# Patient Record
Sex: FEMALE | Race: BLACK OR AFRICAN AMERICAN | NOT HISPANIC OR LATINO | Employment: OTHER | URBAN - METROPOLITAN AREA
[De-identification: names, ages, dates, MRNs, and addresses within clinical notes are randomized per-mention and may not be internally consistent; named-entity substitution may affect disease eponyms.]

---

## 2017-11-02 ENCOUNTER — OFFICE VISIT (OUTPATIENT)
Dept: CARDIOLOGY | Facility: CLINIC | Age: 77
End: 2017-11-02

## 2017-11-02 VITALS
SYSTOLIC BLOOD PRESSURE: 124 MMHG | BODY MASS INDEX: 26.96 KG/M2 | WEIGHT: 182 LBS | DIASTOLIC BLOOD PRESSURE: 76 MMHG | HEART RATE: 91 BPM | HEIGHT: 69 IN

## 2017-11-02 DIAGNOSIS — I42.0 CARDIOMYOPATHY, DILATED, NONISCHEMIC (HCC): Primary | ICD-10-CM

## 2017-11-02 DIAGNOSIS — I10 ESSENTIAL HYPERTENSION: ICD-10-CM

## 2017-11-02 DIAGNOSIS — E78.2 MIXED HYPERLIPIDEMIA: ICD-10-CM

## 2017-11-02 DIAGNOSIS — I44.7 LBBB (LEFT BUNDLE BRANCH BLOCK): ICD-10-CM

## 2017-11-02 DIAGNOSIS — G47.33 OBSTRUCTIVE SLEEP APNEA SYNDROME: ICD-10-CM

## 2017-11-02 PROCEDURE — 99214 OFFICE O/P EST MOD 30 MIN: CPT | Performed by: INTERNAL MEDICINE

## 2017-11-02 PROCEDURE — 93000 ELECTROCARDIOGRAM COMPLETE: CPT | Performed by: INTERNAL MEDICINE

## 2017-11-02 RX ORDER — CARVEDILOL 6.25 MG/1
6.25 TABLET ORAL 2 TIMES DAILY WITH MEALS
Qty: 30 TABLET | Refills: 12 | Status: SHIPPED | OUTPATIENT
Start: 2017-11-02 | End: 2018-12-20 | Stop reason: SDUPTHER

## 2017-11-02 RX ORDER — LEVOTHYROXINE SODIUM 112 UG/1
TABLET ORAL
Refills: 1 | COMMUNITY
Start: 2017-08-30 | End: 2018-01-22 | Stop reason: SDUPTHER

## 2017-11-02 NOTE — PROGRESS NOTES
Date of Office Visit: 17  Encounter Provider: Paddy Haynes MD  Place of Service: [facility] CARDIOLOGY  Patient Name: Eve Mckeon  :1940      Chief Complaint   Patient presents with   • Cardiomyopathy     annual follow up     History of Present Illness  HPI Comments: The patient is a pleasant 77-year-old female who presented in 2015 with some dyspnea  and a left bundle branch block. As part of that evaluation, she had an echocardiogram  that showed mild left ventricular systolic dysfunction, left ventricular ejection fraction  45%, abnormal strain imaging, and moderate concentric hypertrophy. No significant  valvular disease. She had a perfusion stress test that showed no ischemia but evidence of  LV dysfunction so she had cardiac catheterization performed on 2015 which showed  left ventricular ejection fraction of 40% and no significant coronary disease. She comes  in for followup.  She says she has no real limit her activity is low she goes her pace she's gained 4 pounds and she was last year but she's not really been weighing herself every day.  She may be a little more out of breath.  Does get some palpitations about every other day lasting 5-15 minutes but no associated dizziness lightheadedness, near-syncope or syncope denies chest pain and pressure.  Denies orthopnea PND.  She does snore and again is been very fatigued and tired at times very sleepy during the day.    Cardiomyopathy   Pertinent negatives include no abdominal pain, congestion, diaphoresis, fever, headaches, joint swelling, myalgias, nausea, numbness, rash, vertigo, vomiting or weakness.         Past Medical History:   Diagnosis Date   • Anemia    • Cardiomyopathy    • Dyspnea    • Dyspnea    • GERD (gastroesophageal reflux disease)    • Hypertension    • Hypothyroidism    • LBBB (left bundle branch block)          Past Surgical History:   Procedure Laterality Date   • BACK  SURGERY     • BLADDER SURGERY     • CARDIAC CATHETERIZATION      09/24/2015; nonobstructive coronary disease.   • HYSTERECTOMY     • TONSILLECTOMY           Current Outpatient Prescriptions on File Prior to Visit   Medication Sig Dispense Refill   • albuterol (VENTOLIN HFA) 108 (90 BASE) MCG/ACT inhaler Ventolin  (90 Base) MCG/ACT Inhalation Aerosol Solution; Patient Sig: Ventolin  (90 Base) MCG/ACT Inhalation Aerosol Solution ; 0; 15-Jun-2015; Active     • docusate sodium (COLACE) 250 MG capsule Take  by mouth Daily.     • etodolac (LODINE) 400 MG tablet Take  by mouth 2 (Two) Times a Day.     • Ferrous Sulfate (IRON) 325 (65 FE) MG tablet Take  by mouth Daily.     • furosemide (LASIX) 20 MG tablet Take 20 mg by mouth Daily As Needed.     • HYDROcodone-acetaminophen (NORCO) 5-325 MG per tablet Take  by mouth.     • levocetirizine (XYZAL) 5 MG tablet Take 10 mg by mouth Daily.     • levothyroxine (SYNTHROID, LEVOTHROID) 125 MCG tablet Take 112 mcg by mouth Daily.     • Linaclotide (LINZESS) 145 MCG capsule Take 1 capsule by mouth Daily.     • lisinopril (PRINIVIL,ZESTRIL) 2.5 MG tablet TAKE ONE TABLET BY MOUTH DAILY 90 tablet 0   • loratadine (CLARITIN) 10 MG tablet Take  by mouth Daily.     • Melatonin 3 MG capsule Take  by mouth.     • pantoprazole (PROTONIX) 20 MG EC tablet Take  by mouth Daily.     • potassium chloride (MICRO-K) 8 MEQ CR capsule Take  by mouth.     • simvastatin (ZOCOR) 40 MG tablet Take  by mouth Daily.     • [DISCONTINUED] carvedilol (COREG) 3.125 MG tablet Take 3.125 mg by mouth 2 (Two) Times a Day With Meals.       No current facility-administered medications on file prior to visit.          Social History     Social History   • Marital status: Single     Spouse name: N/A   • Number of children: N/A   • Years of education: N/A     Occupational History   • Not on file.     Social History Main Topics   • Smoking status: Former Smoker   • Smokeless tobacco: Not on file   • Alcohol  "use No   • Drug use: Not on file   • Sexual activity: Not on file     Other Topics Concern   • Not on file     Social History Narrative       Family History   Problem Relation Age of Onset   • Heart attack Mother    • Heart failure Mother    • Diabetes Father          Review of Systems   Constitution: Positive for malaise/fatigue. Negative for decreased appetite, diaphoresis, fever, weakness, weight gain and weight loss.   HENT: Negative for congestion, hearing loss, nosebleeds and tinnitus.    Eyes: Negative for blurred vision, double vision, vision loss in left eye, vision loss in right eye and visual disturbance.   Cardiovascular: Negative for orthopnea.        As noted in HPI   Respiratory: Negative for shortness of breath.         As noted HPI   Endocrine: Negative for cold intolerance and heat intolerance.   Hematologic/Lymphatic: Negative for bleeding problem. Does not bruise/bleed easily.   Skin: Negative for color change, flushing, itching and rash.   Musculoskeletal: Negative for arthritis, back pain, joint pain, joint swelling, muscle weakness and myalgias.   Gastrointestinal: Negative for bloating, abdominal pain, constipation, diarrhea, dysphagia, heartburn, hematemesis, hematochezia, melena, nausea and vomiting.   Genitourinary: Negative for bladder incontinence, dysuria, frequency, nocturia and urgency.   Neurological: Negative for dizziness, focal weakness, headaches, light-headedness, loss of balance, numbness, paresthesias and vertigo.   Psychiatric/Behavioral: Negative for depression, memory loss and substance abuse.       Procedures      ECG 12 Lead  Date/Time: 11/2/2017 11:20 AM  Performed by: EVAN FLORES  Authorized by: EVAN FLORES   Comparison: compared with previous ECG   Similar to previous ECG  Rhythm: sinus rhythm  Rate: normal  Conduction: left bundle branch block  QRS axis: normal                 Objective:    /76 (BP Location: Left arm)  Pulse 91  Ht 69\" (175.3 cm)  " Wt 182 lb (82.6 kg)  BMI 26.88 kg/m2       Physical Exam  Physical Exam   Constitutional: She is oriented to person, place, and time. She appears well-developed and well-nourished. No distress.   HENT:   Head: Normocephalic.   Eyes: Conjunctivae are normal. Pupils are equal, round, and reactive to light. No scleral icterus.   Neck: Normal carotid pulses, no hepatojugular reflux and no JVD present. Carotid bruit is not present. No tracheal deviation, no edema and no erythema present. No thyromegaly present.   Cardiovascular: Normal rate, regular rhythm, S1 normal, S2 normal, normal heart sounds and intact distal pulses.   No extrasystoles are present. PMI is displaced.  Exam reveals no distant heart sounds and no friction rub.    No murmur heard.  Pulses:       Carotid pulses are 2+ on the right side, and 2+ on the left side.       Radial pulses are 2+ on the right side, and 2+ on the left side.        Femoral pulses are 2+ on the right side, and 2+ on the left side.       Dorsalis pedis pulses are 2+ on the right side, and 2+ on the left side.        Posterior tibial pulses are 2+ on the right side, and 2+ on the left side.   Pulmonary/Chest: Effort normal and breath sounds normal. No respiratory distress. She has no decreased breath sounds. She has no wheezes. She has no rhonchi. She has no rales. She exhibits no tenderness.   Abdominal: Soft. Bowel sounds are normal. She exhibits no distension and no mass. There is no hepatosplenomegaly. There is no tenderness. There is no rebound and no guarding.   Musculoskeletal: She exhibits no edema, tenderness or deformity.   Neurological: She is alert and oriented to person, place, and time.   Skin: Skin is warm and dry. No rash noted. She is not diaphoretic. No cyanosis or erythema. No pallor. Nails show no clubbing.   Psychiatric: She has a normal mood and affect. Her speech is normal and behavior is normal. Judgment and thought content normal.           Assessment:   1.  This is a 77-year-old female with chronic left bundle branch block, unchanged.  2. Nonischemic cardiomyopathy that may well be hypertension induced.   Heart Failure  Assessment  • NYHA class I - There is no limitation of physical activity. Physical activity does not cause fatigue, palpitations or shortness of breath.  • ACE inhibitor prescribed  • Beta blocker prescribed  • Diuretics prescribed  • Left ventricular function is mildly reduced by qualitative assessment    Plan  • The patient has received heart failure education on the following topics: dietary sodium restriction, medication instructions, minimizing or avoiding NSAID use, symptom management, physical activity, weight monitoring and minimizing alcohol intake  • The heart failure care plan was discussed with the patient today including: continuing the current program  •  The patient was not counseled about ICD or CRT-D implantation due to medical reasons    Subjective/Objective    • Physical exam findings negative for rales and elevated JVP.    I am concerned about her fatigue level.  She could have sleep apnea will take Him to titrate up her carvedilol.  In addition we've asked her to her weight herself daily Elvi 2-3 pounds in a day to give us a call.  We'll see her sooner in 4 months and she'll call for problems.     3. Hypertension as outlined above.  4. Hyperlipidemia on simvastatin   5.  Probable obstructive sleep apnea we are getting her set up for a sleep study.         Plan:

## 2018-01-18 ENCOUNTER — OFFICE VISIT (OUTPATIENT)
Dept: SLEEP MEDICINE | Facility: HOSPITAL | Age: 78
End: 2018-01-18
Attending: INTERNAL MEDICINE

## 2018-01-18 VITALS
HEIGHT: 69 IN | TEMPERATURE: 97.5 F | WEIGHT: 182 LBS | OXYGEN SATURATION: 97 % | HEART RATE: 92 BPM | SYSTOLIC BLOOD PRESSURE: 144 MMHG | DIASTOLIC BLOOD PRESSURE: 82 MMHG | BODY MASS INDEX: 26.96 KG/M2

## 2018-01-18 DIAGNOSIS — G47.33 OBSTRUCTIVE SLEEP APNEA SYNDROME: ICD-10-CM

## 2018-01-18 DIAGNOSIS — G47.10 HYPERSOMNIA WITH SLEEP APNEA: Primary | ICD-10-CM

## 2018-01-18 DIAGNOSIS — G47.30 HYPERSOMNIA WITH SLEEP APNEA: Primary | ICD-10-CM

## 2018-01-18 PROCEDURE — 99204 OFFICE O/P NEW MOD 45 MIN: CPT | Performed by: INTERNAL MEDICINE

## 2018-01-18 PROCEDURE — G0463 HOSPITAL OUTPT CLINIC VISIT: HCPCS

## 2018-01-18 NOTE — PROGRESS NOTES
[facility] New Patient[facility]ultation       Reason for Consultation: LUL    Patient Care Team:  Carlie Martin MD as PCP - Family Medicine  Paddy Haynes MD as Consulting Physician (Cardiology)  Len Hines MD as Consulting Physician (Sleep Medicine)    Chief complaint:  LUL    History of present illness:  Thank you for asking me to see your patient.  The patient is a 77 y.o. female who has nonischemic cardiomyopathy, possibly hypertension induced.  Cardiology asked me to evaluate the patient for LUL.  She has complaints of hypersomnolence and her [place] Sleepiness Scale is abnormal at 16.  She goes to bed between midnight and 1 AM and awakens at 8 AM.  She feels okay upon arising.  She will take a nap every day.  She does not know if she snores because she lives alone.  She has awaken about every 6 months gasping for breath.  25% of the time, she awakens with a headache.  She has leg jerking at night.  She does not use the bathroom at night.    Review of Systems:  Recorded on the Sleep Questionnaire.  Unremarkable except for frequent urination, nasal congestion, arthritis complaints, occasional cough, and some problems swallowing.    History:  Past Medical History:   Diagnosis Date   • Anemia    • Cardiomyopathy    • Dyspnea    • Dyspnea    • GERD (gastroesophageal reflux disease)    • Hypertension    • Hypothyroidism    • LBBB (left bundle branch block)    ,   Past Surgical History:   Procedure Laterality Date   • BACK SURGERY     • BLADDER SURGERY     • CARDIAC CATHETERIZATION      09/24/2015; nonobstructive coronary disease.   • HYSTERECTOMY     • TONSILLECTOMY     ,   Family History   Problem Relation Age of Onset   • Heart attack Mother    • Heart failure Mother    • Diabetes Father     and   Social History   Substance Use Topics   • Smoking status: Former Smoker     Start date: 1962     Quit date: 1982   • Smokeless tobacco: Never Used   • Alcohol use No       Social History:  She will  "have 2-4 sodas a day.    Allergies:  Sulfa antibiotics     Medication Review: I reviewed her medication list.    Vital Signs:    Vitals:    01/18/18 1118   BP: 144/82   BP Location: Left arm   Patient Position: Sitting   Pulse: 92   Temp: 97.5 °F (36.4 °C)   TempSrc: Oral   SpO2: 97%   Weight: 82.6 kg (182 lb)   Height: 175.3 cm (69\")      Body mass index is 26.88 kg/(m^2).    Physical Exam:  Recorded on the [facility] Physical Exam Form and is unremarkable except for:  A large tongue and normal uvula and moderate-severe narrowing of the posterior pharyngeal opening and class II-III MP airway.     Impression:   Hypersomnolence with occasional episode of awakening gasping for air and awakening with a headache 25% of the time.  The patient has a history of nonischemic cardiomyopathy and hypertension.  The patient has a mild-moderate pretest probability of having LUL.    Plan:  Good sleep hygiene measures should be maintained.  Weight loss would be beneficial in this patient who is overweight.    Pathophysiology of LUL described to the patient.  Cardiovascular complications of untreated LUL also reviewed.      After reviewing all with the patient, I would recommend obtaining an attended overnight polysomnogram.  The patient is agreeable to this.  This will be scheduled.     Thank you for requesting me to assist in this patient's care.    Len Hines MD  01/20/18  9:00 AM          "

## 2018-01-20 PROBLEM — G47.30 HYPERSOMNIA WITH SLEEP APNEA: Status: ACTIVE | Noted: 2018-01-20

## 2018-01-20 PROBLEM — G47.10 HYPERSOMNIA WITH SLEEP APNEA: Status: ACTIVE | Noted: 2018-01-20

## 2018-01-22 ENCOUNTER — OFFICE VISIT (OUTPATIENT)
Dept: CARDIOLOGY | Facility: CLINIC | Age: 78
End: 2018-01-22

## 2018-01-22 VITALS
BODY MASS INDEX: 25.92 KG/M2 | DIASTOLIC BLOOD PRESSURE: 79 MMHG | SYSTOLIC BLOOD PRESSURE: 134 MMHG | HEIGHT: 69 IN | WEIGHT: 175 LBS

## 2018-01-22 DIAGNOSIS — I10 ESSENTIAL HYPERTENSION: ICD-10-CM

## 2018-01-22 DIAGNOSIS — E78.2 MIXED HYPERLIPIDEMIA: ICD-10-CM

## 2018-01-22 DIAGNOSIS — I44.7 LBBB (LEFT BUNDLE BRANCH BLOCK): ICD-10-CM

## 2018-01-22 DIAGNOSIS — I42.0 CARDIOMYOPATHY, DILATED, NONISCHEMIC (HCC): Primary | ICD-10-CM

## 2018-01-22 PROCEDURE — 99214 OFFICE O/P EST MOD 30 MIN: CPT | Performed by: INTERNAL MEDICINE

## 2018-01-22 PROCEDURE — 93000 ELECTROCARDIOGRAM COMPLETE: CPT | Performed by: INTERNAL MEDICINE

## 2018-01-22 RX ORDER — ASPIRIN 81 MG/1
81 TABLET, CHEWABLE ORAL DAILY
COMMUNITY

## 2018-02-05 ENCOUNTER — TRANSCRIBE ORDERS (OUTPATIENT)
Dept: SLEEP MEDICINE | Facility: HOSPITAL | Age: 78
End: 2018-02-05

## 2018-02-05 DIAGNOSIS — G47.10 PERSISTENT DISORDER OF INITIATING OR MAINTAINING WAKEFULNESS: Primary | ICD-10-CM

## 2018-02-13 ENCOUNTER — HOSPITAL ENCOUNTER (OUTPATIENT)
Dept: SLEEP MEDICINE | Facility: HOSPITAL | Age: 78
Discharge: HOME | End: 2018-02-13
Admitting: INTERNAL MEDICINE

## 2018-02-13 DIAGNOSIS — G47.10 PERSISTENT DISORDER OF INITIATING OR MAINTAINING WAKEFULNESS: ICD-10-CM

## 2018-02-13 PROCEDURE — 95806 SLEEP STUDY UNATT&RESP EFFT: CPT

## 2018-02-13 PROCEDURE — 95806 SLEEP STUDY UNATT&RESP EFFT: CPT | Performed by: INTERNAL MEDICINE

## 2018-02-20 ENCOUNTER — TELEPHONE (OUTPATIENT)
Dept: SLEEP MEDICINE | Facility: HOSPITAL | Age: 78
End: 2018-02-20

## 2018-02-20 NOTE — TELEPHONE ENCOUNTER
Sending cpap order to [facility].  Pt to return call to discuss ss results and schedule a f/u with dr Hines

## 2018-03-13 ENCOUNTER — TELEPHONE (OUTPATIENT)
Dept: SLEEP MEDICINE | Facility: HOSPITAL | Age: 78
End: 2018-03-13

## 2018-03-13 NOTE — TELEPHONE ENCOUNTER
[facility] called to schedule f/u with Dr. Hines.  Pt has not gotten capp machine and would like to speak with dr Hines before moving forward.  F/u scheduled

## 2018-12-20 NOTE — TELEPHONE ENCOUNTER
Pt's last OV 1/22/18-pt was to follow up in 6 mos.  Did not.  No future appt on file.  Please advise. [facility] RMA

## 2018-12-21 RX ORDER — CARVEDILOL 6.25 MG/1
TABLET ORAL
Qty: 30 TABLET | Refills: 0 | Status: SHIPPED | OUTPATIENT
Start: 2018-12-21 | End: 2019-01-17 | Stop reason: SDUPTHER

## 2018-12-21 RX ORDER — CARVEDILOL 6.25 MG/1
TABLET ORAL
Qty: 180 TABLET | Refills: 0 | OUTPATIENT
Start: 2018-12-21

## 2019-01-18 RX ORDER — CARVEDILOL 6.25 MG/1
TABLET ORAL
Qty: 30 TABLET | Refills: 0 | Status: SHIPPED | OUTPATIENT
Start: 2019-01-18 | End: 2019-01-25 | Stop reason: SDUPTHER

## 2019-01-25 RX ORDER — CARVEDILOL 6.25 MG/1
6.25 TABLET ORAL 2 TIMES DAILY WITH MEALS
Qty: 60 TABLET | Refills: 0 | Status: SHIPPED | OUTPATIENT
Start: 2019-01-25 | End: 2019-02-14 | Stop reason: SDUPTHER

## 2019-02-14 RX ORDER — CARVEDILOL 6.25 MG/1
6.25 TABLET ORAL 2 TIMES DAILY WITH MEALS
Qty: 180 TABLET | Refills: 0 | Status: SHIPPED | OUTPATIENT
Start: 2019-02-14 | End: 2019-06-26 | Stop reason: SDUPTHER

## 2019-02-14 RX ORDER — LISINOPRIL 2.5 MG/1
2.5 TABLET ORAL DAILY
Qty: 90 TABLET | Refills: 0 | Status: SHIPPED | OUTPATIENT
Start: 2019-02-14 | End: 2019-06-26 | Stop reason: SDUPTHER

## 2019-07-05 RX ORDER — LISINOPRIL 2.5 MG/1
2.5 TABLET ORAL DAILY
Qty: 30 TABLET | Refills: 0 | Status: SHIPPED | OUTPATIENT
Start: 2019-07-05 | End: 2019-07-05 | Stop reason: SDUPTHER

## 2019-07-05 RX ORDER — CARVEDILOL 6.25 MG/1
TABLET ORAL
Qty: 60 TABLET | Refills: 0 | Status: SHIPPED | OUTPATIENT
Start: 2019-07-05 | End: 2019-07-05 | Stop reason: SDUPTHER

## 2019-07-05 RX ORDER — LISINOPRIL 2.5 MG/1
2.5 TABLET ORAL DAILY
Qty: 90 TABLET | Refills: 0 | Status: SHIPPED | OUTPATIENT
Start: 2019-07-05 | End: 2020-03-06

## 2019-07-05 RX ORDER — CARVEDILOL 6.25 MG/1
TABLET ORAL
Qty: 180 TABLET | Refills: 0 | Status: SHIPPED | OUTPATIENT
Start: 2019-07-05

## 2019-12-20 ENCOUNTER — HOSPITAL ENCOUNTER (EMERGENCY)
Facility: HOSPITAL | Age: 79
Discharge: HOME | End: 2019-12-20
Attending: EMERGENCY MEDICINE | Admitting: EMERGENCY MEDICINE

## 2019-12-20 ENCOUNTER — APPOINTMENT (OUTPATIENT)
Dept: GENERAL RADIOLOGY | Facility: HOSPITAL | Age: 79
End: 2019-12-20

## 2019-12-20 VITALS
HEART RATE: 79 BPM | DIASTOLIC BLOOD PRESSURE: 81 MMHG | HEIGHT: 69 IN | BODY MASS INDEX: 27.4 KG/M2 | RESPIRATION RATE: 18 BRPM | SYSTOLIC BLOOD PRESSURE: 174 MMHG | TEMPERATURE: 99.2 F | WEIGHT: 185 LBS | OXYGEN SATURATION: 97 %

## 2019-12-20 DIAGNOSIS — B34.9 ACUTE VIRAL SYNDROME: Primary | ICD-10-CM

## 2019-12-20 DIAGNOSIS — R07.89 ATYPICAL CHEST PAIN: ICD-10-CM

## 2019-12-20 LAB
ALBUMIN SERPL-MCNC: 4 G/DL (ref 3.5–5.2)
ALBUMIN/GLOB SERPL: 1.3 G/DL
ALP SERPL-CCNC: 82 U/L (ref 39–117)
ALT SERPL W P-5'-P-CCNC: 15 U/L (ref 1–33)
ANION GAP SERPL CALCULATED.3IONS-SCNC: 12.1 MMOL/L (ref 5–15)
AST SERPL-CCNC: 19 U/L (ref 1–32)
BASOPHILS # BLD AUTO: 0.03 10*3/MM3 (ref 0–0.2)
BASOPHILS NFR BLD AUTO: 0.5 % (ref 0–1.5)
BILIRUB SERPL-MCNC: 0.2 MG/DL (ref 0.2–1.2)
BUN BLD-MCNC: 17 MG/DL (ref 8–23)
BUN/CREAT SERPL: 16.7 (ref 7–25)
CALCIUM SPEC-SCNC: 8.7 MG/DL (ref 8.6–10.5)
CHLORIDE SERPL-SCNC: 104 MMOL/L (ref 98–107)
CO2 SERPL-SCNC: 25.9 MMOL/L (ref 22–29)
CREAT BLD-MCNC: 1.02 MG/DL (ref 0.57–1)
DEPRECATED RDW RBC AUTO: 38.5 FL (ref 37–54)
ELLIPTOCYTES BLD QL SMEAR: ABNORMAL
EOSINOPHIL # BLD AUTO: 0.14 10*3/MM3 (ref 0–0.4)
EOSINOPHIL # BLD MANUAL: 0.48 10*3/MM3 (ref 0–0.4)
EOSINOPHIL NFR BLD AUTO: 2.4 % (ref 0.3–6.2)
EOSINOPHIL NFR BLD MANUAL: 8.2 % (ref 0.3–6.2)
ERYTHROCYTE [DISTWIDTH] IN BLOOD BY AUTOMATED COUNT: 14.6 % (ref 12.3–15.4)
FLUAV AG NPH QL: NEGATIVE
FLUBV AG NPH QL IA: NEGATIVE
GFR SERPL CREATININE-BSD FRML MDRD: 64 ML/MIN/1.73
GLOBULIN UR ELPH-MCNC: 3.1 GM/DL
GLUCOSE BLD-MCNC: 95 MG/DL (ref 65–99)
HCT VFR BLD AUTO: 35.1 % (ref 34–46.6)
HGB BLD-MCNC: 11 G/DL (ref 12–15.9)
HOLD SPECIMEN: NORMAL
HOLD SPECIMEN: NORMAL
LIPASE SERPL-CCNC: 50 U/L (ref 13–60)
LYMPHOCYTES # BLD AUTO: 0.99 10*3/MM3 (ref 0.7–3.1)
LYMPHOCYTES # BLD MANUAL: 0.66 10*3/MM3 (ref 0.7–3.1)
LYMPHOCYTES NFR BLD AUTO: 16.9 % (ref 19.6–45.3)
LYMPHOCYTES NFR BLD MANUAL: 11.2 % (ref 19.6–45.3)
LYMPHOCYTES NFR BLD MANUAL: 9.2 % (ref 5–12)
MCH RBC QN AUTO: 23 PG (ref 26.6–33)
MCHC RBC AUTO-ENTMCNC: 31.3 G/DL (ref 31.5–35.7)
MCV RBC AUTO: 73.3 FL (ref 79–97)
MONOCYTES # BLD AUTO: 0.54 10*3/MM3 (ref 0.1–0.9)
MONOCYTES # BLD AUTO: 0.79 10*3/MM3 (ref 0.1–0.9)
MONOCYTES NFR BLD AUTO: 13.5 % (ref 5–12)
NEUTROPHILS # BLD AUTO: 3.88 10*3/MM3 (ref 1.7–7)
NEUTROPHILS # BLD AUTO: 4.18 10*3/MM3 (ref 1.7–7)
NEUTROPHILS NFR BLD AUTO: 66.4 % (ref 42.7–76)
NEUTROPHILS NFR BLD MANUAL: 71.4 % (ref 42.7–76)
NT-PROBNP SERPL-MCNC: 734.7 PG/ML (ref 5–1800)
OVALOCYTES BLD QL SMEAR: ABNORMAL
PLAT MORPH BLD: NORMAL
PLATELET # BLD AUTO: 263 10*3/MM3 (ref 140–450)
PMV BLD AUTO: 10.7 FL (ref 6–12)
POIKILOCYTOSIS BLD QL SMEAR: ABNORMAL
POTASSIUM BLD-SCNC: 3.9 MMOL/L (ref 3.5–5.2)
PROT SERPL-MCNC: 7.1 G/DL (ref 6–8.5)
RBC # BLD AUTO: 4.79 10*6/MM3 (ref 3.77–5.28)
SCHISTOCYTES BLD QL SMEAR: ABNORMAL
SODIUM BLD-SCNC: 142 MMOL/L (ref 136–145)
TROPONIN T SERPL-MCNC: <0.01 NG/ML (ref 0–0.03)
WBC MORPH BLD: NORMAL
WBC NRBC COR # BLD: 5.85 10*3/MM3 (ref 3.4–10.8)
WHOLE BLOOD HOLD SPECIMEN: NORMAL
WHOLE BLOOD HOLD SPECIMEN: NORMAL

## 2019-12-20 PROCEDURE — 87804 INFLUENZA ASSAY W/OPTIC: CPT | Performed by: EMERGENCY MEDICINE

## 2019-12-20 PROCEDURE — 84484 ASSAY OF TROPONIN QUANT: CPT | Performed by: EMERGENCY MEDICINE

## 2019-12-20 PROCEDURE — 85007 BL SMEAR W/DIFF WBC COUNT: CPT | Performed by: EMERGENCY MEDICINE

## 2019-12-20 PROCEDURE — 99284 EMERGENCY DEPT VISIT MOD MDM: CPT

## 2019-12-20 PROCEDURE — 93005 ELECTROCARDIOGRAM TRACING: CPT | Performed by: EMERGENCY MEDICINE

## 2019-12-20 PROCEDURE — 83880 ASSAY OF NATRIURETIC PEPTIDE: CPT | Performed by: EMERGENCY MEDICINE

## 2019-12-20 PROCEDURE — 36415 COLL VENOUS BLD VENIPUNCTURE: CPT

## 2019-12-20 PROCEDURE — 83690 ASSAY OF LIPASE: CPT | Performed by: EMERGENCY MEDICINE

## 2019-12-20 PROCEDURE — 71046 X-RAY EXAM CHEST 2 VIEWS: CPT

## 2019-12-20 PROCEDURE — 80053 COMPREHEN METABOLIC PANEL: CPT | Performed by: EMERGENCY MEDICINE

## 2019-12-20 PROCEDURE — 85025 COMPLETE CBC W/AUTO DIFF WBC: CPT | Performed by: EMERGENCY MEDICINE

## 2019-12-20 PROCEDURE — 93010 ELECTROCARDIOGRAM REPORT: CPT | Performed by: INTERNAL MEDICINE

## 2019-12-20 RX ORDER — ASPIRIN 81 MG/1
324 TABLET, CHEWABLE ORAL ONCE
Status: DISCONTINUED | OUTPATIENT
Start: 2019-12-20 | End: 2019-12-20

## 2019-12-20 RX ORDER — BENZONATATE 100 MG/1
100 CAPSULE ORAL 3 TIMES DAILY PRN
Qty: 15 CAPSULE | Refills: 0 | Status: SHIPPED | OUTPATIENT
Start: 2019-12-20 | End: 2019-12-25

## 2019-12-20 RX ORDER — SODIUM CHLORIDE 0.9 % (FLUSH) 0.9 %
10 SYRINGE (ML) INJECTION AS NEEDED
Status: DISCONTINUED | OUTPATIENT
Start: 2019-12-20 | End: 2019-12-20 | Stop reason: HOSPADM

## 2019-12-20 NOTE — ED NOTES
"Pt seen at [facility] for cough and flu like symptoms for \"about a month\". Pt felt chest heaviness today. LBBB seen on EKG at [facility] no  Report of chest pains     Anat Ghotra, RN  Resident  12/20/19 1113    "

## 2019-12-20 NOTE — ED PROVIDER NOTES
EMERGENCY DEPARTMENT ENCOUNTER    Room Number:    Date of encounter:  2019  PCP: Carlie Martin MD  Historian: Patient, EMS      HPI:  Chief Complaint: Chest pain  A complete HPI/ROS/PMH/PSH/SH/FH are unobtainable due to: None    Context: Eve Mckeon is a 79 y.o. female who presents to the ED c/o chest pain.  Onset today.  Time 10 AM.  Symptoms are constant.  It is heaviness in character.  Nonradiating.  It is 4/10 in intensity.  Improves with nothing.  Worsens with cough.  Not exertional or pleuritic.  She reports intermittent upper respiratory symptoms for the past month.  She reports cough that is wet but she is not able to cough anything up.  No known fever.  No shortness of breath, vomiting or diaphoresis.      PAST MEDICAL HISTORY  Active Ambulatory Problems     Diagnosis Date Noted   • Hypersomnia with sleep apnea 2018     Resolved Ambulatory Problems     Diagnosis Date Noted   • No Resolved Ambulatory Problems     Past Medical History:   Diagnosis Date   • Anemia    • Cardiomyopathy (CMS/HCC)    • Dyspnea    • GERD (gastroesophageal reflux disease)    • Hypertension    • Hypothyroidism    • LBBB (left bundle branch block)          PAST SURGICAL HISTORY  Past Surgical History:   Procedure Laterality Date   • BACK SURGERY     • BLADDER SURGERY     • CARDIAC CATHETERIZATION      2015; nonobstructive coronary disease.   • HYSTERECTOMY     • TONSILLECTOMY           FAMILY HISTORY  Family History   Problem Relation Age of Onset   • Heart attack Mother    • Heart failure Mother    • Diabetes Father          SOCIAL HISTORY  Social History     Socioeconomic History   • Marital status: Single     Spouse name: Not on file   • Number of children: Not on file   • Years of education: Not on file   • Highest education level: Not on file   Tobacco Use   • Smoking status: Former Smoker     Start date:      Last attempt to quit: 1982     Years since quittin.9   • Smokeless tobacco:  Never Used   Substance and Sexual Activity   • Alcohol use: No   • Drug use: Never   • Sexual activity: Defer         ALLERGIES  Sulfa antibiotics        REVIEW OF SYSTEMS  Review of Systems     All systems reviewed and negative except for those discussed in HPI.       PHYSICAL EXAM    I have reviewed the triage vital signs and nursing notes.    ED Triage Vitals [12/20/19 1638]   Temp Heart Rate Resp BP SpO2   99.2 °F (37.3 °C) 84 18 164/79 98 %      Temp src Heart Rate Source Patient Position BP Location FiO2 (%)   -- Monitor Lying Left arm --       Physical Exam  GENERAL: not distressed  HENT: nares patent, oropharynx clear, uvula midline  EYES: no scleral icterus  CV: regular rhythm, regular rate, 2+ radial pulses bilaterally  RESPIRATORY: normal effort, clear to auscultation bilaterally, frequent cough  ABDOMEN: soft, nontender  MUSCULOSKELETAL: no deformity, no lower extremity edema or tenderness, reproducible chest wall tenderness  NEURO: alert, moves all extremities, follows commands  SKIN: warm, dry        LAB RESULTS  Recent Results (from the past 24 hour(s))   Lipase    Collection Time: 12/20/19  4:50 PM   Result Value Ref Range    Lipase 50 13 - 60 U/L   BNP    Collection Time: 12/20/19  4:50 PM   Result Value Ref Range    proBNP 734.7 5.0-1,800.0 pg/mL   Comprehensive Metabolic Panel    Collection Time: 12/20/19  4:50 PM   Result Value Ref Range    Glucose 95 65 - 99 mg/dL    BUN 17 8 - 23 mg/dL    Creatinine 1.02 (H) 0.57 - 1.00 mg/dL    Sodium 142 136 - 145 mmol/L    Potassium 3.9 3.5 - 5.2 mmol/L    Chloride 104 98 - 107 mmol/L    CO2 25.9 22.0 - 29.0 mmol/L    Calcium 8.7 8.6 - 10.5 mg/dL    Total Protein 7.1 6.0 - 8.5 g/dL    Albumin 4.00 3.50 - 5.20 g/dL    ALT (SGPT) 15 1 - 33 U/L    AST (SGOT) 19 1 - 32 U/L    Alkaline Phosphatase 82 39 - 117 U/L    Total Bilirubin 0.2 0.2 - 1.2 mg/dL    eGFR  African Amer 64 >60 mL/min/1.73    Globulin 3.1 gm/dL    A/G Ratio 1.3 g/dL    BUN/Creatinine Ratio 16.7  7.0 - 25.0    Anion Gap 12.1 5.0 - 15.0 mmol/L   Troponin    Collection Time: 12/20/19  4:50 PM   Result Value Ref Range    Troponin T <0.010 0.000 - 0.030 ng/mL   Light Blue Top    Collection Time: 12/20/19  4:50 PM   Result Value Ref Range    Extra Tube hold for add-on    Green Top (Gel)    Collection Time: 12/20/19  4:50 PM   Result Value Ref Range    Extra Tube Hold for add-ons.    Lavender Top    Collection Time: 12/20/19  4:50 PM   Result Value Ref Range    Extra Tube hold for add-on    Gold Top - SST    Collection Time: 12/20/19  4:50 PM   Result Value Ref Range    Extra Tube Hold for add-ons.    CBC Auto Differential    Collection Time: 12/20/19  4:50 PM   Result Value Ref Range    WBC 5.85 3.40 - 10.80 10*3/mm3    RBC 4.79 3.77 - 5.28 10*6/mm3    Hemoglobin 11.0 (L) 12.0 - 15.9 g/dL    Hematocrit 35.1 34.0 - 46.6 %    MCV 73.3 (L) 79.0 - 97.0 fL    MCH 23.0 (L) 26.6 - 33.0 pg    MCHC 31.3 (L) 31.5 - 35.7 g/dL    RDW 14.6 12.3 - 15.4 %    RDW-SD 38.5 37.0 - 54.0 fl    MPV 10.7 6.0 - 12.0 fL    Platelets 263 140 - 450 10*3/mm3    Neutrophil % 66.4 42.7 - 76.0 %    Lymphocyte % 16.9 (L) 19.6 - 45.3 %    Monocyte % 13.5 (H) 5.0 - 12.0 %    Eosinophil % 2.4 0.3 - 6.2 %    Basophil % 0.5 0.0 - 1.5 %    Neutrophils, Absolute 3.88 1.70 - 7.00 10*3/mm3    Lymphocytes, Absolute 0.99 0.70 - 3.10 10*3/mm3    Monocytes, Absolute 0.79 0.10 - 0.90 10*3/mm3    Eosinophils, Absolute 0.14 0.00 - 0.40 10*3/mm3    Basophils, Absolute 0.03 0.00 - 0.20 10*3/mm3   Manual Differential    Collection Time: 12/20/19  4:50 PM   Result Value Ref Range    Neutrophil % 71.4 42.7 - 76.0 %    Lymphocyte % 11.2 (L) 19.6 - 45.3 %    Monocyte % 9.2 5.0 - 12.0 %    Eosinophil % 8.2 (H) 0.3 - 6.2 %    Neutrophils Absolute 4.18 1.70 - 7.00 10*3/mm3    Lymphocytes Absolute 0.66 (L) 0.70 - 3.10 10*3/mm3    Monocytes Absolute 0.54 0.10 - 0.90 10*3/mm3    Eosinophils Absolute 0.48 (H) 0.00 - 0.40 10*3/mm3    Elliptocytes Slight/1+ None Seen     Ovalocytes Mod/2+ None Seen    Poikilocytes Large/3+ None Seen    Schistocytes Slight/1+ None Seen    WBC Morphology Normal Normal    Platelet Morphology Normal Normal   Influenza Antigen, Rapid - Swab, Nasopharynx    Collection Time: 12/20/19  6:17 PM   Result Value Ref Range    Influenza A Ag, EIA Negative Negative    Influenza B Ag, EIA Negative Negative       Ordered the above labs and independently reviewed the results.        RADIOLOGY  Xr Chest 2 View    Result Date: 12/20/2019  XR CHEST 2 VW-  HISTORY: Female who is 79 years-old,  chest pain  TECHNIQUE: Frontal and lateral views of the chest  COMPARISON: None available  FINDINGS: The heart appears mildly enlarged. Aorta is tortuous. Mild central pulmonary vascular congestion. No focal pulmonary consolidation, pleural effusion, or pneumothorax. No acute osseous process.      No focal pulmonary consolidation. Mild cardiomegaly and central pulmonary vascular congestion. Tortuous aorta. Follow-up/further evaluation can be obtained as indicated.  This report was finalized on 12/20/2019 5:22 PM by Dr. Enmanuel Chow M.D.        I ordered the above noted radiological studies. Reviewed by me and discussed with radiologist.  See dictation for official radiology interpretation.      PROCEDURES    Procedures      MEDICATIONS GIVEN IN ER    Medications - No data to display      PROGRESS, DATA ANALYSIS, CONSULTS, AND MEDICAL DECISION MAKING    All labs have been independently reviewed by me.  All radiology studies have been reviewed by me and discussed with radiologist dictating the report.   EKG's independently viewed and interpreted by me.  Discussion below represents my analysis of pertinent findings related to patient's condition, differential diagnosis, treatment plan and final disposition.    DDx includes acute coronary syndrome, pulmonary embolism, thoracic aortic dissection, pneumonia, pneumothorax, musculoskeletal pain, GERD or esophageal spasm, anxiety,  myocarditis/pericarditis, esophageal rupture, pancreatitis.     History: 0  EC  Age: 4  Risk factors: 2    HEART score: 5          ED Course as of Dec 21 0043   Fri Dec 20, 2019   1646 EKG interpreted by myself.  Time 1642.  Sinus rhythm.  Heart rate 71.  Left atrial abnormality.  Left bundle branch block.  Negative Sgarbossa criteria.    [TD]   1647 On medical chart review, old EKG obtained from 2018.  Sinus rhythm.  Heart rate 72.  Left bundle branch block.    [TD]   1738 proBNP: 734.7 [TD]   1744 Troponin T: <0.010 [TD]   1745 Chest x-ray interpreted by myself.  No evidence of pneumonia.    [TD]   184 Influenza A Ag, EIA: Negative [TD]   184 EDACS score is 10. This is low risk.    Influenza B Ag, EIA: Negative [TD]   1853 I offered the patient a repeat troponin.  She declines.  She says that she feels very convinced that her chest pain is due to her cough.  She states that she was sent here only because parked of all got an EKG and was not sure if she had had a prior left bundle branch block.  This is clinically reasonable as her pain seems rather atypical.  Although her heart score is elevated, her Edacs score is low risk.  I gave her good return precautions.    [TD]      ED Course User Index  [TD] Fco Sterling II, MD           AS OF 12:43 AM VITALS:    BP - 174/81  HR - 79  TEMP - 99.2 °F (37.3 °C)  O2 SATS - 97%        DIAGNOSIS  Final diagnoses:   Acute viral syndrome   Atypical chest pain         DISPOSITION  DISCHARGE    FOLLOW-UP  Carlie Martin MD  [address]  [facility] [id number] [phone]    Schedule an appointment as soon as possible for a visit   As needed    Paddy Haynes MD  [address]  [facility] [id number] [phone]    Schedule an appointment as soon as possible for a visit in 3 days  for follow up of your chest pain         Medication List      New Prescriptions    benzonatate 100 MG capsule  Commonly known as:  TESSALON  Take 1 capsule by mouth 3  (Three) Times a Day As Needed for Cough for up   to 5 days.                   Fco Sterling II, MD  12/21/19 0044

## 2020-03-06 RX ORDER — LISINOPRIL 2.5 MG/1
2.5 TABLET ORAL DAILY
Qty: 30 TABLET | Refills: 0 | Status: SHIPPED | OUTPATIENT
Start: 2020-03-06 | End: 2020-06-02

## 2020-06-02 ENCOUNTER — TELEPHONE (OUTPATIENT)
Dept: CARDIOLOGY | Facility: CLINIC | Age: 80
End: 2020-06-02

## 2020-06-02 RX ORDER — LISINOPRIL 2.5 MG/1
TABLET ORAL
Qty: 30 TABLET | Refills: 0 | Status: SHIPPED | OUTPATIENT
Start: 2020-06-02 | End: 2020-08-17

## 2020-07-15 RX ORDER — CARVEDILOL 6.25 MG/1
TABLET ORAL
Qty: 180 TABLET | Refills: 0 | OUTPATIENT
Start: 2020-07-15

## 2020-08-06 RX ORDER — LISINOPRIL 2.5 MG/1
TABLET ORAL
Qty: 30 TABLET | Refills: 0 | OUTPATIENT
Start: 2020-08-06

## 2020-08-17 RX ORDER — LISINOPRIL 2.5 MG/1
TABLET ORAL
Qty: 30 TABLET | Refills: 0 | Status: SHIPPED | OUTPATIENT
Start: 2020-08-17

## 2020-08-18 RX ORDER — LISINOPRIL 2.5 MG/1
TABLET ORAL
Qty: 90 TABLET | OUTPATIENT
Start: 2020-08-18

## 2020-09-11 RX ORDER — LISINOPRIL 2.5 MG/1
TABLET ORAL
Qty: 30 TABLET | Refills: 0 | OUTPATIENT
Start: 2020-09-11